# Patient Record
(demographics unavailable — no encounter records)

---

## 2024-10-08 NOTE — PHYSICAL EXAM
[de-identified] : right ankle- kin intact- no bruising, slight swelling and tenderness lateral malleolus, FROM walking with slight limp

## 2024-10-08 NOTE — DISCUSSION/SUMMARY
[FreeTextEntry1] : no evidence of fracture at this time Cold compress applied to ankle for 10 minutes Ibuprofen 400 mg # 1 tablet dispensed. can take at home every 4 to 6 hours PRN Ankle wrapped with elastic bandage. Reviewed RICE (rest, ice, compression, elevation). hand out given  spoke with mother by phone to advise diagnosis and treatment  follow up with Xray for any worsening s/[facility]  reviewed Anticipatory guidance given

## 2024-10-08 NOTE — HISTORY OF PRESENT ILLNESS
[FreeTextEntry6] : Twisted right ankle while playing basketball in gym. Just happened able to bear weight. no numbness, tingling or radiation pain 8/10 no other injury no other complaints

## 2024-10-08 NOTE — RISK ASSESSMENT
[At least 1 hour of physical activity a day] : does not do at least 1 hour of physical activity a day [Screen time (except homework) less than 2 hours a day] : no screen time (except homework) less than 2 hours a day [Uses tobacco] : does not use tobacco [Uses drugs] : does not use drugs  [Drinks alcohol] : does not drink alcohol [Has/had oral sex] : has not had oral sex [Has had sexual intercourse] : has not had sexual intercourse [Gets depressed, anxious, or irritable/has mood swings] : does not get depressed, anxious, or irritable/has mood swings [Has thought about hurting self or considered suicide] : has not thought about hurting self or considered suicide

## 2024-12-06 NOTE — DISCUSSION/SUMMARY
[FreeTextEntry1] : Cold compress applied to foot for 10 minutes pt had wanted pain reliever but changed his mind foot wrapped with elastic bandage. Reviewed RICE (rest, ice, compression, elevation).  return for any new, worsening or persistent signs or symptoms

## 2024-12-06 NOTE — HISTORY OF PRESENT ILLNESS
[FreeTextEntry6] : c/o pain right foot just happened while playing basketball denies any radiation. numbness or tingling  pain 8 /10 no other injuries or complaints

## 2024-12-06 NOTE — PHYSICAL EXAM
[NL] : no acute distress, alert [de-identified] : top of right foot- skin intact no bruising or swelling. area tender to touch FROM

## 2025-03-11 NOTE — HISTORY OF PRESENT ILLNESS
[FreeTextEntry6] : c/o HA , irritated throat, runny stuffy nose, cough, feels hot denies SOB, chest pain, fever, chills. no recent loss of taste or smell symptoms started this morning no medicine taken. no history of seasonal or other allergies  no one ill at home no other complaints

## 2025-03-11 NOTE — PHYSICAL EXAM
[Clear Rhinorrhea] : no rhinorrhea [Mucoid Discharge] : no mucoid discharge [Inflamed Nasal Mucosa] : no nasal mucosa inflammation [Erythematous Oropharynx] : nonerythematous oropharynx [Enlarged Tonsils] : tonsils not enlarged [Exudate] : no exudate [NL] : regular rate and rhythm, normal S1, S2 audible, no murmurs [FreeTextEntry5] : watery

## 2025-03-11 NOTE — DISCUSSION/SUMMARY
[FreeTextEntry1] : -Symptoms and exam c/w viral illness. spoke with mother to advise COVID/FLU/RSV PCR tests ordered Ibuprofen 400 mg #1 tablet PO given pt can return to school when fever free for 24 hours and feeling improved   fever management -Tylenol or Ibuprofen   sore throat: cepacol lozenges, sore throat spray   stuffy nose- saline nasal spray, decongestant pills or spray (for less than three days)   runny nose, sneezing- antihistamines Claritin, Allegra Zyrtec   Encouraged rest.   Increase fluids.  Use honey for cough.  avoid OTC cough syrups unless preventing from sleeping mother will  pt from school office